# Patient Record
Sex: MALE | Race: WHITE
[De-identification: names, ages, dates, MRNs, and addresses within clinical notes are randomized per-mention and may not be internally consistent; named-entity substitution may affect disease eponyms.]

---

## 2018-02-02 ENCOUNTER — HOSPITAL ENCOUNTER (EMERGENCY)
Dept: HOSPITAL 43 - DL.ED | Age: 23
Discharge: HOME | End: 2018-02-02
Payer: COMMERCIAL

## 2018-02-02 DIAGNOSIS — S60.222A: Primary | ICD-10-CM

## 2018-02-02 NOTE — EDM.PDOC
Scribed by Shayla Alejo 02/02/18 1236 for Sascha Love MD





ED HPI GENERAL MEDICAL PROBLEM





- General


Chief Complaint: Upper Extremity Injury/Pain


Stated Complaint: POSSIBLE BROKEN HAND


Time Seen by Provider: 02/02/18 16:03


Source of Information: Reports: Patient, RN, RN Notes Reviewed


History Limitations: Reports: No Limitations





- History of Present Illness


INITIAL COMMENTS - FREE TEXT/NARRATIVE: 


Patient presents with pain and swelling to left hand after hitting it on a 

padded wall while playing basketball earlier today. Denies any other injury.





Onset: Today


Location: Reports: Upper Extremity, Left


Quality: Reports: Ache


Severity: Moderate


Improves with: Reports: None


Worsens with: Reports: None


Associated Symptoms: Reports: No Other Symptoms





- Related Data


 Allergies











Allergy/AdvReac Type Severity Reaction Status Date / Time


 


No Known Allergies Allergy   Verified 02/02/18 15:59











Home Meds: 


 Home Meds





. [No Known Home Meds]  02/02/18 [History]











Review of Systems





- Review of Systems


Review Of Systems: ROS reveals no pertinent complaints other than HPI.





ED EXAM, GENERAL





- Physical Exam


Exam: See Below


Exam Limited By: No Limitations


General Appearance: Alert, WD/WN, No Apparent Distress


Head: Atraumatic, Normocephalic


Respiratory/Chest: No Respiratory Distress


Cardiovascular: Normal Peripheral Pulses (at bilateral upper extremities.)


Back Exam: Normal Inspection, Full Range of Motion, NT


Extremities: Other (Swelling and tenderness to dorsum of left hand overlying 

3rd and 4th MCPJs. Skin is intact. Full but painful range of motion.)


Neurological: Alert, Oriented, CN II-XII Intact, Normal Cognition, Normal Gait, 

Normal Reflexes, No Motor/Sensory Deficits


Psychiatric: Normal Affect, Normal Mood


Skin Exam: Warm, Dry, Intact, Normal Color, No Rash





ED TRAUMA EXTREMITY PROCEDURES





- Splinting


  ** Left Upper Extremity


Splint Site: left hand


Pre-Procedure NV Status: Normal


Post-Procedure NV Status: Normal


Splint Material: Metal


Splint Design: Volar


Applied & Form Fitted By: Nurse


Provider Post-Splint Application NV Check: NV Status Normal, Good Position


Complications: No





Course





- Vital Signs


Last Recorded V/S: 


 Last Vital Signs











Temp  37.2 C   02/02/18 15:59


 


Pulse  114 H  02/02/18 15:59


 


Resp  16   02/02/18 15:59


 


BP  131/88   02/02/18 15:59


 


Pulse Ox  99   02/02/18 15:59














- Orders/Labs/Meds


Orders: 


 Active Orders 24 hr











 Category Date Time Status


 


 Hand Comp Min 3V Lt [CR] Urgent Exams  02/02/18 16:03 Taken














- Radiology Interpretation


Free Text/Narrative:: 


Left hand x-ray:   No acute fracture.  See rad report.





Departure





- Departure


Time of Disposition: 16:32


Disposition: Home, Self-Care 01


Condition: Good


Clinical Impression: 


Traumatic hematoma of left hand


Qualifiers:


 Encounter type: initial encounter Qualified Code(s): S60.222A - Contusion of 

left hand, initial encounter








- Discharge Information


Instructions:  Hematoma, Easy-to-Read


Forms:  ED Department Discharge


Additional Instructions: 


Wear splint as needed for comfort. 


Rest, ice and elevate left hand.


Active as tolerated.


Use over the counter Tylenol or ibuprofen for pain. Follow directions on bottle 

for dosing and precautions.





- My Orders


Last 24 Hours: 


My Active Orders





02/02/18 16:03


Hand Comp Min 3V Lt [CR] Urgent 














- Assessment/Plan


Last 24 Hours: 


My Active Orders





02/02/18 16:03


Hand Comp Min 3V Lt [CR] Urgent 














I have read and agree with the documentation that has been completed regarding 

this visit. By signing this record, I attest that the documentation was 

completed in my physical presence and is an accurate record of the encounter.

## 2018-02-28 ENCOUNTER — HOSPITAL ENCOUNTER (EMERGENCY)
Dept: HOSPITAL 43 - DL.ED | Age: 23
Discharge: HOME | End: 2018-02-28
Payer: COMMERCIAL

## 2018-02-28 DIAGNOSIS — R00.2: Primary | ICD-10-CM

## 2018-02-28 LAB
CHLORIDE SERPL-SCNC: 99 MMOL/L (ref 101–111)
SODIUM SERPL-SCNC: 135 MMOL/L (ref 135–145)

## 2018-02-28 NOTE — EDM.PDOC
ED HPI GENERAL MEDICAL PROBLEM





- General


Stated Complaint: IN BY AMBULANCE


Time Seen by Provider: 02/28/18 03:00


Source of Information: Reports: Patient, EMS


History Limitations: Reports: No Limitations





- History of Present Illness


INITIAL COMMENTS - FREE TEXT/NARRATIVE: 





ED via [facility] with c/o of 2 weeks of chest tightness tonight woke with feeling 

"flutter beat" in heart. 


Location: Reports: Chest





- Related Data


 Allergies











Allergy/AdvReac Type Severity Reaction Status Date / Time


 


No Known Allergies Allergy   Verified 02/02/18 15:59











Home Meds: 


 Home Meds





. [No Known Home Meds]  02/02/18 [History]











Past Medical History





- Past Health History


Medical/Surgical History: Denies Medical/Surgical History





Social & Family History





- Family History


Family Medical History: Noncontributory





- Tobacco Use


Smoking Status *Q: Never Smoker


Second Hand Smoke Exposure: No





- Caffeine Use


Caffeine Use: Reports: None





- Recreational Drug Use


Recreational Drug Use: No





ED ROS GENERAL





- Review of Systems


Review Of Systems: See Below


Constitutional: Reports: No Symptoms


HEENT: Reports: No Symptoms


Respiratory: Denies: Shortness of Breath


Cardiovascular: Reports: Chest Pain.  Denies: Dyspnea on Exertion, Edema, 

Lightheadedness


GI/Abdominal: Reports: No Symptoms





ED EXAM, GENERAL





- Physical Exam


Exam: See Below


Exam Limited By: No Limitations


General Appearance: Alert, No Apparent Distress


Eye Exam: Bilateral Eye: EOMI


Ears: Normal External Exam, Normal TMs


Ear Exam: Bilateral Ear: TM normal


Nose: Normal Inspection


Throat/Mouth: Normal Inspection


Head: Atraumatic, Normocephalic


Neck: Normal Inspection


Respiratory/Chest: No Respiratory Distress, Lungs Clear, Normal Breath Sounds


Cardiovascular: Normal Peripheral Pulses, Regular Rate, Rhythm


GI/Abdominal: Normal Bowel Sounds, Soft


Back Exam: Normal Inspection


Extremities: Normal Inspection


Neurological: Alert, Oriented, Normal Cognition


Psychiatric: Anxious


Skin Exam: Warm, Dry, Intact, Normal Color





EKG INTERPRETATION


Rhythm: NSR





Course





- Vital Signs


Last Recorded V/S: 


 Last Vital Signs











Temp  97.4 F   02/28/18 02:58


 


Pulse  106 H  02/28/18 02:58


 


Resp  16   02/28/18 02:58


 


BP  136/90   02/28/18 02:58


 


Pulse Ox  100   02/28/18 02:58














- Orders/Labs/Meds


Orders: 


 Active Orders 24 hr











 Category Date Time Status


 


 EKG 12 Lead [EKG Documentation Completion] [RC] URGENT Care  02/28/18 03:09 

Active











Labs: 


 Laboratory Tests











  02/28/18 02/28/18 02/28/18 Range/Units





  03:20 03:20 03:56 


 


WBC  7.0    (5.0-10.0)  10^3/uL


 


RBC  5.05    (4.6-6.2)  10^6/uL


 


Hgb  15.6    (14.0-18.0)  g/dL


 


Hct  44.8    (40.0-54.0)  %


 


MCV  88.7    ()  fL


 


MCH  30.9    (27.0-34.0)  pg


 


MCHC  34.8    (33.0-35.0)  g/dL


 


Plt Count  219    (150-450)  10^3/uL


 


Neut % (Auto)  68.0    (42.2-75.2)  %


 


Lymph % (Auto)  22.0    (20.5-50.1)  %


 


Mono % (Auto)  8.0    (2-8)  %


 


Eos % (Auto)  1.7    (1.0-3.0)  %


 


Baso % (Auto)  0.3    (0.0-1.0)  %


 


Sodium   135   (135-145)  mmol/L


 


Potassium   4.0   (3.6-5.0)  mmol/L


 


Chloride   99 L   (101-111)  mmol/L


 


Carbon Dioxide   28.0   (21.0-31.0)  mmol/L


 


Anion Gap   12.0   


 


BUN   14   (7-18)  mg/dL


 


Creatinine   0.9   (0.6-1.3)  mg/dL


 


Est Cr Clr Drug Dosing   113.40   mL/min


 


Estimated GFR (MDRD)   > 60   


 


BUN/Creatinine Ratio   15.55   


 


Glucose   128 H   ()  mg/dL


 


Calcium   9.5   (8.4-10.2)  mg/dl


 


Total Bilirubin   1.0   (0.2-1.0)  mg/dL


 


AST   28   (10-42)  IU/L


 


ALT   37   (10-60)  IU/L


 


Alkaline Phosphatase   95   ()  IU/L


 


Total Protein   7.3   (6.7-8.2)  g/dl


 


Albumin   4.5   (3.2-5.5)  g/dl


 


Globulin   2.8   


 


Albumin/Globulin Ratio   1.61   


 


Urine Color    Yellow  (YELLOW)  


 


Urine Appearance    Clear  (CLEAR)  


 


Urine pH    6.5  (5.0-9.0)  


 


Ur Specific Gravity    1.010  (1.005-1.030)  


 


Urine Protein    Negative  (NEGATIVE)  


 


Urine Glucose (UA)    Negative  (NEGATIVE)  


 


Urine Ketones    Negative  (NEGATIVE)  


 


Urine Occult Blood    Negative  (NEGATIVE)  


 


Urine Nitrite    Negative  (NEGATIVE)  


 


Urine Bilirubin    Negative  (NEGATIVE)  


 


Urine Urobilinogen    0.2  (0.2-1.0)  mg/dL


 


Ur Leukocyte Esterase    Negative  (NEGATIVE)  


 


Urine RBC    0-5  /HPF


 


Urine WBC    0-5  (0-5/HPF)  /HPF


 


Ur Epithelial Cells    Occasional  /HPF


 


Urine Bacteria    Few  (0-FEW/HPF)  /HPF


 


Urine Opiates Screen     (NEGATIVE)  


 


Ur Oxycodone Screen     (NEGATIVE)  


 


Urine Methadone Screen     (NEGATIVE)  


 


Ur Barbiturates Screen     (NEGATIVE)  


 


U Tricyclic Antidepress     (NEGATIVE)  


 


Ur Phencyclidine Scrn     (NEGATIVE)  


 


Ur Amphetamine Screen     (NEGATIVE)  


 


U Methamphetamines Scrn     (NEGATIVE)  


 


Urine MDMA Screen     (NEGATIVE)  


 


U Benzodiazepines Scrn     (NEGATIVE)  


 


Urine Cocaine Screen     (NEGATIVE)  


 


U Marijuana (THC) Screen     (NEGATIVE)  














  02/28/18 Range/Units





  03:58 


 


WBC   (5.0-10.0)  10^3/uL


 


RBC   (4.6-6.2)  10^6/uL


 


Hgb   (14.0-18.0)  g/dL


 


Hct   (40.0-54.0)  %


 


MCV   ()  fL


 


MCH   (27.0-34.0)  pg


 


MCHC   (33.0-35.0)  g/dL


 


Plt Count   (150-450)  10^3/uL


 


Neut % (Auto)   (42.2-75.2)  %


 


Lymph % (Auto)   (20.5-50.1)  %


 


Mono % (Auto)   (2-8)  %


 


Eos % (Auto)   (1.0-3.0)  %


 


Baso % (Auto)   (0.0-1.0)  %


 


Sodium   (135-145)  mmol/L


 


Potassium   (3.6-5.0)  mmol/L


 


Chloride   (101-111)  mmol/L


 


Carbon Dioxide   (21.0-31.0)  mmol/L


 


Anion Gap   


 


BUN   (7-18)  mg/dL


 


Creatinine   (0.6-1.3)  mg/dL


 


Est Cr Clr Drug Dosing   mL/min


 


Estimated GFR (MDRD)   


 


BUN/Creatinine Ratio   


 


Glucose   ()  mg/dL


 


Calcium   (8.4-10.2)  mg/dl


 


Total Bilirubin   (0.2-1.0)  mg/dL


 


AST   (10-42)  IU/L


 


ALT   (10-60)  IU/L


 


Alkaline Phosphatase   ()  IU/L


 


Total Protein   (6.7-8.2)  g/dl


 


Albumin   (3.2-5.5)  g/dl


 


Globulin   


 


Albumin/Globulin Ratio   


 


Urine Color   (YELLOW)  


 


Urine Appearance   (CLEAR)  


 


Urine pH   (5.0-9.0)  


 


Ur Specific Gravity   (1.005-1.030)  


 


Urine Protein   (NEGATIVE)  


 


Urine Glucose (UA)   (NEGATIVE)  


 


Urine Ketones   (NEGATIVE)  


 


Urine Occult Blood   (NEGATIVE)  


 


Urine Nitrite   (NEGATIVE)  


 


Urine Bilirubin   (NEGATIVE)  


 


Urine Urobilinogen   (0.2-1.0)  mg/dL


 


Ur Leukocyte Esterase   (NEGATIVE)  


 


Urine RBC   /HPF


 


Urine WBC   (0-5/HPF)  /HPF


 


Ur Epithelial Cells   /HPF


 


Urine Bacteria   (0-FEW/HPF)  /HPF


 


Urine Opiates Screen  Negative  (NEGATIVE)  


 


Ur Oxycodone Screen  Negative  (NEGATIVE)  


 


Urine Methadone Screen  Negative  (NEGATIVE)  


 


Ur Barbiturates Screen  Negative  (NEGATIVE)  


 


U Tricyclic Antidepress  Negative  (NEGATIVE)  


 


Ur Phencyclidine Scrn  Negative  (NEGATIVE)  


 


Ur Amphetamine Screen  Negative  (NEGATIVE)  


 


U Methamphetamines Scrn  Negative  (NEGATIVE)  


 


Urine MDMA Screen  Negative  (NEGATIVE)  


 


U Benzodiazepines Scrn  Negative  (NEGATIVE)  


 


Urine Cocaine Screen  Negative  (NEGATIVE)  


 


U Marijuana (THC) Screen  Negative  (NEGATIVE)  














- Re-Assessments/Exams


Free Text/Narrative Re-Assessment/Exam: 





No acute distress, visiting with RA, laughing. Telemetry no ectopy noted. 








Departure





- Departure


Time of Disposition: 04:32


Disposition: Home, Self-Care 01


Condition: Good


Clinical Impression: 


 Heart palpitations





Instructions:  Palpitations, Easy-to-Read


Referrals: 


PCP,None [Primary Care Provider] - 


Forms:  ED Department Discharge


Additional Instructions: 


increase fluid intake


limit energy and caffinated drinks


follow up with primary care this week








- My Orders


Last 24 Hours: 


My Active Orders





02/28/18 03:09


EKG 12 Lead [EKG Documentation Completion] [RC] URGENT 














- Assessment/Plan


Last 24 Hours: 


My Active Orders





02/28/18 03:09


EKG 12 Lead [EKG Documentation Completion] [RC] URGENT

## 2018-02-28 NOTE — EKG
02/28/2018- BERGSTAD, ROSLYN ALA -

 

EKG per my reading shows sinus rhythm with nonspecific intraventricular

conduction delay.

 

DD:  02/28/2018 11:18:05

DT:  02/28/2018 11:34:38

MODL

Job #:  [id number]